# Patient Record
Sex: FEMALE | Race: WHITE | NOT HISPANIC OR LATINO | URBAN - METROPOLITAN AREA
[De-identification: names, ages, dates, MRNs, and addresses within clinical notes are randomized per-mention and may not be internally consistent; named-entity substitution may affect disease eponyms.]

---

## 2022-11-10 ENCOUNTER — OFFICE VISIT (OUTPATIENT)
Dept: URBAN - METROPOLITAN AREA CLINIC 50 | Facility: CLINIC | Age: 42
End: 2022-11-10
Payer: COMMERCIAL

## 2022-11-10 ENCOUNTER — WEB ENCOUNTER (OUTPATIENT)
Dept: URBAN - METROPOLITAN AREA CLINIC 50 | Facility: CLINIC | Age: 42
End: 2022-11-10

## 2022-11-10 VITALS
BODY MASS INDEX: 21.34 KG/M2 | WEIGHT: 132.8 LBS | SYSTOLIC BLOOD PRESSURE: 94 MMHG | HEART RATE: 67 BPM | DIASTOLIC BLOOD PRESSURE: 61 MMHG | TEMPERATURE: 97.3 F | HEIGHT: 66 IN

## 2022-11-10 DIAGNOSIS — K30 STOMACH BURNING: ICD-10-CM

## 2022-11-10 DIAGNOSIS — K21.9 GASTROESOPHAGEAL REFLUX DISEASE, UNSPECIFIED WHETHER ESOPHAGITIS PRESENT: ICD-10-CM

## 2022-11-10 PROCEDURE — 99244 OFF/OP CNSLTJ NEW/EST MOD 40: CPT | Performed by: INTERNAL MEDICINE

## 2022-11-10 RX ORDER — PANTOPRAZOLE SODIUM 20 MG/1
TAPER AS DIRECTED TABLET, DELAYED RELEASE ORAL
Qty: 105 | Refills: 0 | OUTPATIENT

## 2022-11-10 NOTE — HPI-TODAY'S VISIT:
This patient was referred by Dr. Sandy Edwards for an evaluation of reflux.  A copy of this will be sent to the referring provider.  42 y.o. 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

## 2022-12-09 ENCOUNTER — OFFICE VISIT (OUTPATIENT)
Dept: URBAN - METROPOLITAN AREA TELEHEALTH 2 | Facility: TELEHEALTH | Age: 42
End: 2022-12-09
Payer: COMMERCIAL

## 2022-12-09 ENCOUNTER — LAB OUTSIDE AN ENCOUNTER (OUTPATIENT)
Dept: URBAN - METROPOLITAN AREA TELEHEALTH 2 | Facility: TELEHEALTH | Age: 42
End: 2022-12-09

## 2022-12-09 ENCOUNTER — TELEPHONE ENCOUNTER (OUTPATIENT)
Dept: URBAN - METROPOLITAN AREA CLINIC 50 | Facility: CLINIC | Age: 42
End: 2022-12-09

## 2022-12-09 VITALS — HEIGHT: 66 IN | WEIGHT: 132.8 LBS | BODY MASS INDEX: 21.34 KG/M2

## 2022-12-09 DIAGNOSIS — K30 STOMACH BURNING: ICD-10-CM

## 2022-12-09 DIAGNOSIS — K59.09 CHANGE IN BOWEL MOVEMENTS INTERMITTENT CONSTIPATION. URGENCY IN THE MORNING.: ICD-10-CM

## 2022-12-09 DIAGNOSIS — K21.9 GASTROESOPHAGEAL REFLUX DISEASE, UNSPECIFIED WHETHER ESOPHAGITIS PRESENT: ICD-10-CM

## 2022-12-09 DIAGNOSIS — K21.9 ACID REFLUX: ICD-10-CM

## 2022-12-09 DIAGNOSIS — Z12.11 COLON CANCER SCREENING: ICD-10-CM

## 2022-12-09 PROBLEM — 271681002: Status: ACTIVE | Noted: 2022-11-10

## 2022-12-09 PROBLEM — 14760008: Status: ACTIVE | Noted: 2022-12-09

## 2022-12-09 PROCEDURE — 99213 OFFICE O/P EST LOW 20 MIN: CPT | Performed by: INTERNAL MEDICINE

## 2022-12-09 RX ORDER — PANTOPRAZOLE SODIUM 20 MG/1
TAPER AS DIRECTED TABLET, DELAYED RELEASE ORAL
Qty: 105 | Refills: 0 | Status: ACTIVE | COMMUNITY

## 2022-12-09 RX ORDER — PANTOPRAZOLE SODIUM 20 MG/1
AS DIRECTED TABLET, DELAYED RELEASE ORAL
Qty: 120 | Refills: 3 | OUTPATIENT
Start: 2022-12-09

## 2022-12-09 RX ORDER — SODIUM, POTASSIUM,MAG SULFATES 17.5-3.13G
354ML SOLUTION, RECONSTITUTED, ORAL ORAL
Qty: 1 | Refills: 0 | OUTPATIENT
Start: 2022-12-09 | End: 2022-12-11

## 2022-12-09 NOTE — HPI-TODAY'S VISIT:
43 yo F presents via [facility] for follow up  States the she is still struggling with the reflux  She did well on 2 pills 2x/day  Went back to bigger dose  When went monalisa it was painful  Hurt in stoach  Feeling throat irritation  Still coughing - improved, not as bad as it used to be  No dysphagia  No nausea No vomiting + constiption (recently) - will have to take stool softener/miralax to go  She does not like taking something to go to the bathroom

## 2023-02-16 PROBLEM — 235595009: Status: ACTIVE | Noted: 2022-11-10

## 2023-03-01 ENCOUNTER — WEB ENCOUNTER (OUTPATIENT)
Dept: URBAN - METROPOLITAN AREA SURGERY CENTER 18 | Facility: SURGERY CENTER | Age: 43
End: 2023-03-01

## 2023-03-07 ENCOUNTER — DASHBOARD ENCOUNTERS (OUTPATIENT)
Age: 43
End: 2023-03-07

## 2023-03-07 ENCOUNTER — OFFICE VISIT (OUTPATIENT)
Dept: URBAN - METROPOLITAN AREA SURGERY CENTER 18 | Facility: SURGERY CENTER | Age: 43
End: 2023-03-07
Payer: COMMERCIAL

## 2023-03-07 DIAGNOSIS — K21.9 ACID REFLUX: ICD-10-CM

## 2023-03-07 DIAGNOSIS — Z12.11 COLON CANCER SCREENING: ICD-10-CM

## 2023-03-07 PROCEDURE — 43235 EGD DIAGNOSTIC BRUSH WASH: CPT | Performed by: INTERNAL MEDICINE

## 2023-03-07 PROCEDURE — G8907 PT DOC NO EVENTS ON DISCHARG: HCPCS | Performed by: INTERNAL MEDICINE

## 2023-03-07 PROCEDURE — G0121 COLON CA SCRN NOT HI RSK IND: HCPCS | Performed by: INTERNAL MEDICINE

## 2023-03-07 RX ORDER — PANTOPRAZOLE SODIUM 20 MG/1
TAPER AS DIRECTED TABLET, DELAYED RELEASE ORAL
Qty: 105 | Refills: 0 | Status: ACTIVE | COMMUNITY

## 2023-03-07 RX ORDER — PANTOPRAZOLE SODIUM 20 MG/1
AS DIRECTED TABLET, DELAYED RELEASE ORAL
Qty: 120 | Refills: 3 | Status: ACTIVE | COMMUNITY
Start: 2022-12-09

## 2023-05-12 ENCOUNTER — WEB ENCOUNTER (OUTPATIENT)
Dept: URBAN - METROPOLITAN AREA CLINIC 96 | Facility: CLINIC | Age: 43
End: 2023-05-12

## 2023-05-17 ENCOUNTER — WEB ENCOUNTER (OUTPATIENT)
Dept: URBAN - METROPOLITAN AREA CLINIC 50 | Facility: CLINIC | Age: 43
End: 2023-05-17

## 2023-05-17 RX ORDER — PANTOPRAZOLE SODIUM 40 MG/1
1 TABLET TABLET, DELAYED RELEASE ORAL TWICE A DAY
Qty: 180 TABLET | Refills: 3
Start: 2022-12-09

## 2023-06-02 ENCOUNTER — WEB ENCOUNTER (OUTPATIENT)
Dept: URBAN - METROPOLITAN AREA CLINIC 50 | Facility: CLINIC | Age: 43
End: 2023-06-02